# Patient Record
Sex: MALE | Race: BLACK OR AFRICAN AMERICAN
[De-identification: names, ages, dates, MRNs, and addresses within clinical notes are randomized per-mention and may not be internally consistent; named-entity substitution may affect disease eponyms.]

---

## 2019-08-12 ENCOUNTER — HOSPITAL ENCOUNTER (INPATIENT)
Dept: HOSPITAL 74 - YASAS | Age: 49
LOS: 5 days | Discharge: OTHER INSTITUTION | DRG: 773 | End: 2019-08-17
Attending: SURGERY | Admitting: SURGERY
Payer: COMMERCIAL

## 2019-08-12 VITALS — BODY MASS INDEX: 22.8 KG/M2

## 2019-08-12 DIAGNOSIS — I10: ICD-10-CM

## 2019-08-12 DIAGNOSIS — Z91.14: ICD-10-CM

## 2019-08-12 DIAGNOSIS — F11.23: Primary | ICD-10-CM

## 2019-08-12 DIAGNOSIS — F14.20: ICD-10-CM

## 2019-08-12 PROCEDURE — HZ2ZZZZ DETOXIFICATION SERVICES FOR SUBSTANCE ABUSE TREATMENT: ICD-10-PCS | Performed by: ALLERGY & IMMUNOLOGY

## 2019-08-12 RX ADMIN — Medication SCH MG: at 22:51

## 2019-08-12 RX ADMIN — NICOTINE SCH: 21 PATCH TRANSDERMAL at 22:52

## 2019-08-12 NOTE — HP
COWS





- Scale


Resting Pulse: 0= MS 80 or Below


Sweatin= Chills/Flushing


Restless Observation: 0= Sits Still


Pupil Size: 0= Normal to Room Light


Bone or Joint Aches: 1= Mild Discomfort


Runny Nose/ Eye Tearin= Runny Nose/Eyes


GI Upset > 30mins: 1= Stomach Cramp


Tremor Observation: 0= None


Yawning Observation: 1= 1-2x During Session


Anxiety or Irritability: 1=Feels Anxious/Irritable


Goose Flesh Skin: 3=Piloerection


COWS Score: 10





CIWA Score





- Admission Criteria


[facility] Guidelines: Admission for Medically Managed Detox: 


Requires at least one of the followin. CIWA greater than 12


2. Seizures within the past 24 hours


3. Delirium tremens within the past 24 hours


4. Hallucinations within the past 24 hours


5. Acute intervention needed for co  occurring medical disorder


6. Acute intervention needed for co  occurring psychiatric disorder


7. Severe withdrawal that cannot be handled at a lower level of care (continued


    vomiting, continued diarrhea, abnormal vital signs) requiring intravenous


    medication and/or fluids


8. Pregnancy








Admission [facility]


Chief Complaint: 


detox from heroin


Allergies/Adverse Reactions: 


 Allergies











Allergy/AdvReac Type Severity Reaction Status Date / Time


 


No Known Allergies Allergy   Verified 19 17:44











History of Present Illness: 


Mr. Aguirre is a 50yo male with heroin use disorder, cocaine use disorder, and 

HTN who presents for detox from heroin. He has been snorting usng 1 bundle/

daily x 6 months, following the death of his mother. He has been using heroin 

since his 20s. He also snorts $100 of cocaine daily. He reports drinking 2 oz 

of vodka daily. He has been to detox multiple times and is requesting methadone 

for detox. He currently endorses fatigue, fever, chills, productive cough. He 

denies n/v/d. He is currently homeless and sometimes stays in shelters in 

[place]. He reports taking his amlodipine daily.





PMH: heroin use disorder, cocaine use disorder, and HTN


surgical hx: jaw fx


family hx: father HTN


meds: amlodipine


NKDA





- Ebola screening


Have you traveled outside of the country in the last 21 days: No


Have you had contact with anyone from an Ebola affected area: No





- Review of Systems


Constitutional: Chills, Diaphoresis, Fever, Weakness


EENT: reports: Nose Congestion


Respiratory: reports: Productive cough (whitish sputum).  denies: Shortness of 

Breath, Wheezing


Cardiac: denies: Chest Pain


GI: denies: Diarrhea, Nausea, Vomiting


Musculoskeletal: reports: Joint Pain


Integumentary: reports: Rash


Neuro: denies: Headache, Dizziness


Endocrine: reports: No Symptoms Reported


Hematology: reports: No Symptoms Reported


Psychiatric: reports: Judgement Intact, Orientated x3, Agitated (mild)





Patient History





- Patient Medical History


Hx Asthma: No


Hx Hypertension: Yes


Hx Diabetes: No





- Patient Surgical History


Past Surgical History: Yes


Other Surgical History: jaw





- Smoking Cessation


Smoking history: Current every day smoker


Have you smoked in the past 12 months: Yes


Aproximately how many cigarettes per day: 20


Initiated information on smoking cessation: Yes


'Breaking Loose' booklet given: 19





- Substances abused


  ** Alcohol


Substance route: Oral


Frequency: Daily


Amount used: 1 pint of vodka


Age of first use: 15


Date of last use: 19





  ** Heroin


Substance route: Inhalation


Frequency: Daily


Amount used: 10 bags/day


Age of first use: 20


Date of last use: 19





  ** Cocaine


Substance route: Inhalation


Frequency: Daily


Amount used: $100


Age of first use: 25


Date of last use: 19





Family Disease History





- Family Disease History


Family Disease History: Heart Disease: Father (HTN)





Admission Physical Exam [facility]





- Vital Signs


Vital Signs: 


 Vital Signs - 24 hr











  19





  17:48


 


Temperature 100.3 F H


 


Pulse Rate 79


 


Respiratory 16





Rate 


 


Blood Pressure 162/110 H














- Physical


General Appearance: Yes: Other (somnolent)


HEENTM: Yes: Hearing grossly Normal, Normocephalic, ADRY, Other (dry mucous 

membranes)


Respiratory: Yes: Lungs Clear, No Respiratory Distress


Neck: Yes: Other (rash)


Cardiology: Yes: Regular Rhythm, Regular Rate


Abdominal: Yes: Normal Bowel Sounds, Non Tender


Back: Yes: Normal Inspection


Musculoskeletal: Yes: full range of Motion


Extremities: Yes: Normal Capillary Refill


Neurological: Yes: CNs II-XII NML intact, Fully Oriented, Alert, Motor Strength 

5/5


Integumentary: Yes: Erythema (non-prurutic, non-tender macular rash along left 

chest, above clavicle, onto upper back and lateral neck)





- Diagnostic


(1) Severe heroin use disorder


Current Visit: Yes   Status: Chronic   





(2) Cocaine use disorder


Current Visit: Yes   Status: Chronic   





(3) Hypertension


Current Visit: Yes   Status: Chronic   


Qualifiers: 


   Hypertension type: essential hypertension   Qualified Code(s): I10 - 

Essential (primary) hypertension   





Cleared for Admission [facility]





- Detox or Rehab


[facility] Level of Care: Medically Managed





Breathalyzer





- Breathalyzer


Breathalyzer: 0





Inpatient Rehab Admission





- Rehab Decision to Admit


Inpatient rehab admission?: No

## 2019-08-13 LAB
ALBUMIN SERPL-MCNC: 3.5 G/DL (ref 3.4–5)
ALP SERPL-CCNC: 55 U/L (ref 45–117)
ALT SERPL-CCNC: 13 U/L (ref 13–61)
ANION GAP SERPL CALC-SCNC: 5 MMOL/L (ref 8–16)
AST SERPL-CCNC: 10 U/L (ref 15–37)
BILIRUB SERPL-MCNC: 0.5 MG/DL (ref 0.2–1)
BUN SERPL-MCNC: 15.1 MG/DL (ref 7–18)
CALCIUM SERPL-MCNC: 9.1 MG/DL (ref 8.5–10.1)
CHLORIDE SERPL-SCNC: 102 MMOL/L (ref 98–107)
CO2 SERPL-SCNC: 32 MMOL/L (ref 21–32)
CREAT SERPL-MCNC: 1.1 MG/DL (ref 0.55–1.3)
DEPRECATED RDW RBC AUTO: 13.6 % (ref 11.9–15.9)
GLUCOSE SERPL-MCNC: 79 MG/DL (ref 74–106)
HCT VFR BLD CALC: 43 % (ref 35.4–49)
HGB BLD-MCNC: 14.4 GM/DL (ref 11.7–16.9)
MCH RBC QN AUTO: 30.9 PG (ref 25.7–33.7)
MCHC RBC AUTO-ENTMCNC: 33.4 G/DL (ref 32–35.9)
MCV RBC: 92.4 FL (ref 80–96)
PLATELET # BLD AUTO: 312 K/MM3 (ref 134–434)
PMV BLD: 8.1 FL (ref 7.5–11.1)
POTASSIUM SERPLBLD-SCNC: 4 MMOL/L (ref 3.5–5.1)
PROT SERPL-MCNC: 7 G/DL (ref 6.4–8.2)
RBC # BLD AUTO: 4.65 M/MM3 (ref 4–5.6)
SODIUM SERPL-SCNC: 140 MMOL/L (ref 136–145)
WBC # BLD AUTO: 8.7 K/MM3 (ref 4–10)

## 2019-08-13 RX ADMIN — HYDROXYZINE PAMOATE PRN MG: 25 CAPSULE ORAL at 22:12

## 2019-08-13 RX ADMIN — Medication SCH TAB: at 10:10

## 2019-08-13 RX ADMIN — ASPIRIN 81 MG SCH MG: 81 TABLET ORAL at 10:09

## 2019-08-13 RX ADMIN — AMLODIPINE BESYLATE SCH MG: 10 TABLET ORAL at 10:10

## 2019-08-13 RX ADMIN — Medication PRN MG: at 22:12

## 2019-08-13 RX ADMIN — NICOTINE SCH: 21 PATCH TRANSDERMAL at 10:10

## 2019-08-13 RX ADMIN — Medication SCH MG: at 22:12

## 2019-08-13 NOTE — EKG
Test Reason : 

Blood Pressure : ***/*** mmHG

Vent. Rate : 063 BPM     Atrial Rate : 063 BPM

   P-R Int : 146 ms          QRS Dur : 096 ms

    QT Int : 450 ms       P-R-T Axes : 038 -38 043 degrees

   QTc Int : 460 ms

 

NORMAL SINUS RHYTHM

LEFT AXIS DEVIATION

INCOMPLETE RIGHT BUNDLE BRANCH BLOCK

MINIMAL VOLTAGE CRITERIA FOR LVH, MAY BE NORMAL VARIANT

SEPTAL INFARCT , AGE UNDETERMINED

ABNORMAL ECG

NO PREVIOUS ECGS AVAILABLE

Confirmed by Vickey Espinal MD ([id number]) on 8/13/2019 11:28:41 AM

 

Referred By:             Confirmed By:Vickey Espinal MD

## 2019-08-14 LAB
APPEARANCE UR: CLEAR
BILIRUB UR STRIP.AUTO-MCNC: NEGATIVE MG/DL
COLOR UR: YELLOW
KETONES UR QL STRIP: NEGATIVE
LEUKOCYTE ESTERASE UR QL STRIP.AUTO: NEGATIVE
NITRITE UR QL STRIP: NEGATIVE
PH UR: 7.5 [PH] (ref 5–8)
PROT UR QL STRIP: (no result)
PROT UR QL STRIP: NEGATIVE
SP GR UR: 1.01 (ref 1.01–1.03)
UROBILINOGEN UR STRIP-MCNC: 0.2 MG/DL (ref 0.2–1)

## 2019-08-14 RX ADMIN — AMLODIPINE BESYLATE SCH MG: 10 TABLET ORAL at 10:12

## 2019-08-14 RX ADMIN — CLINDAMYCIN PHOSPHATE SCH APPLIC: 10 SOLUTION TOPICAL at 22:22

## 2019-08-14 RX ADMIN — Medication SCH TAB: at 10:12

## 2019-08-14 RX ADMIN — ASPIRIN 81 MG SCH MG: 81 TABLET ORAL at 10:12

## 2019-08-14 RX ADMIN — CLINDAMYCIN PHOSPHATE SCH APPLIC: 10 SOLUTION TOPICAL at 15:55

## 2019-08-14 RX ADMIN — PENICILLIN V POTASSIUM SCH MG: 500 TABLET, FILM COATED ORAL at 15:55

## 2019-08-14 RX ADMIN — PENICILLIN V POTASSIUM SCH MG: 500 TABLET, FILM COATED ORAL at 22:22

## 2019-08-14 RX ADMIN — Medication PRN MG: at 22:22

## 2019-08-14 RX ADMIN — HYDROXYZINE PAMOATE PRN MG: 25 CAPSULE ORAL at 10:12

## 2019-08-14 RX ADMIN — Medication SCH MG: at 22:22

## 2019-08-14 RX ADMIN — NICOTINE SCH: 21 PATCH TRANSDERMAL at 10:12

## 2019-08-14 RX ADMIN — IBUPROFEN PRN MG: 400 TABLET, FILM COATED ORAL at 22:24

## 2019-08-14 NOTE — EKG
Test Reason : 

Blood Pressure : ***/*** mmHG

Vent. Rate : 060 BPM     Atrial Rate : 060 BPM

   P-R Int : 148 ms          QRS Dur : 096 ms

    QT Int : 436 ms       P-R-T Axes : 049 -11 042 degrees

   QTc Int : 436 ms

 

NORMAL SINUS RHYTHM

NONSPECIFIC T WAVE ABNORMALITY

ABNORMAL ECG

WHEN COMPARED WITH ECG OF 12-AUG-2019 20:53,

INCOMPLETE RIGHT BUNDLE BRANCH BLOCK IS NO LONGER PRESENT

CRITERIA FOR SEPTAL INFARCT ARE NO LONGER PRESENT

Confirmed by CHANTEL LANDRUM, JUANA ([id number]) on 8/14/2019 11:48:30 AM

 

Referred By:             Confirmed By:JUANA GOLDEN MD

## 2019-08-15 RX ADMIN — CLINDAMYCIN PHOSPHATE SCH APPLIC: 10 SOLUTION TOPICAL at 22:21

## 2019-08-15 RX ADMIN — PENICILLIN V POTASSIUM SCH MG: 500 TABLET, FILM COATED ORAL at 10:05

## 2019-08-15 RX ADMIN — CLINDAMYCIN PHOSPHATE SCH APPLIC: 10 SOLUTION TOPICAL at 10:05

## 2019-08-15 RX ADMIN — Medication SCH MG: at 22:21

## 2019-08-15 RX ADMIN — NICOTINE SCH: 21 PATCH TRANSDERMAL at 10:05

## 2019-08-15 RX ADMIN — PENICILLIN V POTASSIUM SCH MG: 500 TABLET, FILM COATED ORAL at 22:21

## 2019-08-15 RX ADMIN — Medication SCH TAB: at 10:05

## 2019-08-15 RX ADMIN — IBUPROFEN PRN MG: 400 TABLET, FILM COATED ORAL at 22:22

## 2019-08-15 RX ADMIN — AMLODIPINE BESYLATE SCH MG: 10 TABLET ORAL at 10:05

## 2019-08-15 RX ADMIN — ASPIRIN 81 MG SCH MG: 81 TABLET ORAL at 10:05

## 2019-08-15 RX ADMIN — Medication PRN MG: at 22:21

## 2019-08-16 RX ADMIN — Medication SCH TAB: at 10:42

## 2019-08-16 RX ADMIN — PENICILLIN V POTASSIUM SCH MG: 500 TABLET, FILM COATED ORAL at 22:18

## 2019-08-16 RX ADMIN — ASPIRIN 81 MG SCH MG: 81 TABLET ORAL at 10:41

## 2019-08-16 RX ADMIN — AMLODIPINE BESYLATE SCH MG: 10 TABLET ORAL at 10:42

## 2019-08-16 RX ADMIN — NICOTINE SCH: 21 PATCH TRANSDERMAL at 10:42

## 2019-08-16 RX ADMIN — CLINDAMYCIN PHOSPHATE SCH APPLIC: 10 SOLUTION TOPICAL at 10:41

## 2019-08-16 RX ADMIN — CLINDAMYCIN PHOSPHATE SCH APPLIC: 10 SOLUTION TOPICAL at 22:18

## 2019-08-16 RX ADMIN — Medication PRN MG: at 22:19

## 2019-08-16 RX ADMIN — Medication SCH MG: at 22:18

## 2019-08-16 RX ADMIN — PENICILLIN V POTASSIUM SCH MG: 500 TABLET, FILM COATED ORAL at 10:42

## 2019-08-17 ENCOUNTER — HOSPITAL ENCOUNTER (INPATIENT)
Dept: HOSPITAL 74 - YASAS | Age: 49
LOS: 12 days | Discharge: HOME | DRG: 772 | End: 2019-08-29
Attending: NEUROMUSCULOSKELETAL MEDICINE & OMM | Admitting: NEUROMUSCULOSKELETAL MEDICINE & OMM
Payer: COMMERCIAL

## 2019-08-17 VITALS — DIASTOLIC BLOOD PRESSURE: 80 MMHG | SYSTOLIC BLOOD PRESSURE: 131 MMHG | HEART RATE: 64 BPM | TEMPERATURE: 97.6 F

## 2019-08-17 DIAGNOSIS — L02.411: ICD-10-CM

## 2019-08-17 DIAGNOSIS — L85.3: ICD-10-CM

## 2019-08-17 DIAGNOSIS — F14.20: ICD-10-CM

## 2019-08-17 DIAGNOSIS — F11.20: Primary | ICD-10-CM

## 2019-08-17 DIAGNOSIS — K08.89: ICD-10-CM

## 2019-08-17 DIAGNOSIS — L02.412: ICD-10-CM

## 2019-08-17 DIAGNOSIS — L73.9: ICD-10-CM

## 2019-08-17 DIAGNOSIS — I10: ICD-10-CM

## 2019-08-17 PROCEDURE — HZ42ZZZ GROUP COUNSELING FOR SUBSTANCE ABUSE TREATMENT, COGNITIVE-BEHAVIORAL: ICD-10-PCS | Performed by: ALLERGY & IMMUNOLOGY

## 2019-08-17 RX ADMIN — Medication SCH TAB: at 10:39

## 2019-08-17 RX ADMIN — CLINDAMYCIN PHOSPHATE SCH APPLIC: 10 SOLUTION TOPICAL at 10:39

## 2019-08-17 RX ADMIN — ASPIRIN 81 MG SCH MG: 81 TABLET ORAL at 10:39

## 2019-08-17 RX ADMIN — Medication PRN MG: at 23:09

## 2019-08-17 RX ADMIN — Medication SCH MG: at 23:15

## 2019-08-17 RX ADMIN — AMLODIPINE BESYLATE SCH MG: 10 TABLET ORAL at 10:39

## 2019-08-17 RX ADMIN — CLINDAMYCIN PHOSPHATE SCH APPLIC: 10 SOLUTION TOPICAL at 23:08

## 2019-08-17 RX ADMIN — NICOTINE SCH: 21 PATCH TRANSDERMAL at 10:39

## 2019-08-17 RX ADMIN — IBUPROFEN PRN MG: 400 TABLET, FILM COATED ORAL at 10:41

## 2019-08-17 NOTE — DS
[facility] Detox Discharge Summary


Admission Date: 


19





Discharge Date: 19





- History


Present History: Cocaine Dependence, Opioid Dependence


Additional Comments: 





PATIENT GOING TO [facility] ([place]) FOR AFTERCARE. PATIENT 

WAS DISCHARGED FROM DETOX UNIT TO BE TAKEN OVER TO REHAB UNIT IN STABLE MEDICAL 

CONDITION.


Pertinent Past History: 





HTN.





- Physical Exam Results


Vital Signs: 


 Vital Signs











Temperature  97.6 F   19 06:24


 


Pulse Rate  64   19 06:24


 


Respiratory Rate  18   19 06:24


 


Blood Pressure  131/80   19 06:24


 


O2 Sat by Pulse Oximetry (%)      











Pertinent Admission Physical Exam Findings: 





WITHDRAWAL SYMPTOMS.





 Laboratory Tests











  19





  08:05 08:05 08:05


 


WBC  8.7  


 


RBC  4.65  


 


Hgb  14.4  


 


Hct  43.0  


 


MCV  92.4  


 


MCH  30.9  


 


MCHC  33.4  


 


RDW  13.6  


 


Plt Count  312  


 


MPV  8.1  


 


Sodium   140 


 


Potassium   4.0 


 


Chloride   102 


 


Carbon Dioxide   32 


 


Anion Gap   5 L 


 


BUN   15.1 


 


Creatinine   1.1 


 


Est GFR (CKD-EPI)AfAm   90.88 


 


Est GFR (CKD-EPI)NonAf   78.41 


 


Random Glucose   79 


 


Calcium   9.1 


 


Total Bilirubin   0.5 


 


AST   10 L 


 


ALT   13 


 


Alkaline Phosphatase   55 


 


Total Protein   7.0 


 


Albumin   3.5 


 


Urine Color   


 


Urine Appearance   


 


Urine pH   


 


Ur Specific Gravity   


 


Urine Protein   


 


Urine Glucose (UA)   


 


Urine Ketones   


 


Urine Blood   


 


Urine Nitrite   


 


Urine Bilirubin   


 


Urine Urobilinogen   


 


Ur Leukocyte Esterase   


 


RPR Titer    Nonreactive


 


TB (QFT) Incubation   


 


TB Test (QFT) Nil   


 


TB Test (QFT) Mitogen   


 


TB Test (QFT) Antigen   


 


TB Test (QFT)   


 


TB Positive Criteria   














  19





  08:05 16:07


 


WBC  


 


RBC  


 


Hgb  


 


Hct  


 


MCV  


 


MCH  


 


MCHC  


 


RDW  


 


Plt Count  


 


MPV  


 


Sodium  


 


Potassium  


 


Chloride  


 


Carbon Dioxide  


 


Anion Gap  


 


BUN  


 


Creatinine  


 


Est GFR (CKD-EPI)AfAm  


 


Est GFR (CKD-EPI)NonAf  


 


Random Glucose  


 


Calcium  


 


Total Bilirubin  


 


AST  


 


ALT  


 


Alkaline Phosphatase  


 


Total Protein  


 


Albumin  


 


Urine Color   Yellow


 


Urine Appearance   Clear


 


Urine pH   7.5


 


Ur Specific Gravity   1.008 L


 


Urine Protein   Negative


 


Urine Glucose (UA)   Trace


 


Urine Ketones   Negative


 


Urine Blood   Negative


 


Urine Nitrite   Negative


 


Urine Bilirubin   Negative


 


Urine Urobilinogen   0.2


 


Ur Leukocyte Esterase   Negative


 


RPR Titer  


 


TB (QFT) Incubation   


 


TB Test (QFT) Nil  0.09 


 


TB Test (QFT) Mitogen  >10.00 


 


TB Test (QFT) Antigen  0.09 


 


TB Test (QFT)  Negative 


 


TB Positive Criteria   








LABS NOTED.





- Treatment


Hospital Course: Detox Protocol Followed, Detoxed Safely, Responded well, 

Discharged Condition Good, Rehab Referral Accepted


Patient has Accepted a [facility] Referral to: [facility] ([place], [place]).





- Medication


Discharge Medications: 


Ambulatory Orders





Amlodipine Besylate [Norvasc -] 10 mg PO DAILY 30 Days #30 tablet 19 


Aspirin 81 mg PO DAILY 30 Days #30 tab.chew 19 











- Diagnosis


(1) Cocaine use disorder


Status: Chronic   





(2) Hypertension


Status: Chronic   


Qualifiers: 


   Hypertension type: essential hypertension   Qualified Code(s): I10 - 

Essential (primary) hypertension   





(3) Severe heroin use disorder


Status: Acute   





- AMA


Did Patient Leave Against Medical Advice: No





[facility] COWS





- Scale


Resting Pulse: 0= DC 80 or Below


Sweatin= No chills or Flushing


Restless Observation: 1= Difficult to Sit Still


Pupil Size: 0= Normal to Room Light


Bone or Joint Aches: 0= None


Runny Nose/ Eye Tearin= None


GI Upset > 30mins: 0= None


Tremor Observation of Outstretched Hands: 0= None


Yawning Observation: 1= 1-2x During Session


Anxiety or Irritability: 0= None


Goose Flesh Skin: 0=Smooth Skin


COWS Score: 2

## 2019-08-17 NOTE — HP
[facility] MD Rehab Assess/Revision





- Admission History


Admitted to Rehab [facility]


Date of Admission to Rehab: 08/17/2019





- Vital signs


Vital Signs: 





NOTED; STABLE.





- Findings


Detox History & Physical reviewed: Yes


Concur with findings: Yes


Comments/Additional Findings: PATIENT'S MEDICAL / MEDICATION HISTORY REVIEWED 

PRIOR TO DISCHARGE FROM DETOX UNIT. PATIENT WAS DISCHARGED FROM DETOX UNIT TO 

BE TAKEN OVER TO REHAB UNIT IN STABLE MEDICAL CONDITION.





Inpatient Rehab Admission





- Rehab Decision to Admit


Inpatient rehab admission?: Yes





- Initial Determination


Are CD services needed?: Yes


Free of communicable disease: Yes


Not in need of hospitalization: Yes





- Rehab Admission Criteria


Previous failed treatment: Yes


Poor recovery environment: Yes


Comorbidities: Yes


Lacks judgement: No


Patient is meeting Inpatient Rehab admission criteria:: Yes

## 2019-08-18 RX ADMIN — Medication SCH TAB: at 10:36

## 2019-08-18 RX ADMIN — CLINDAMYCIN PHOSPHATE SCH APPLIC: 10 SOLUTION TOPICAL at 21:57

## 2019-08-18 RX ADMIN — Medication SCH MG: at 21:56

## 2019-08-18 RX ADMIN — ACETAMINOPHEN PRN MG: 325 TABLET ORAL at 10:38

## 2019-08-18 RX ADMIN — NICOTINE SCH: 21 PATCH TRANSDERMAL at 10:36

## 2019-08-18 RX ADMIN — CLINDAMYCIN PHOSPHATE SCH APPLIC: 10 SOLUTION TOPICAL at 10:36

## 2019-08-18 RX ADMIN — Medication PRN MG: at 21:56

## 2019-08-18 RX ADMIN — AMLODIPINE BESYLATE SCH MG: 10 TABLET ORAL at 10:35

## 2019-08-18 RX ADMIN — ASPIRIN 81 MG SCH MG: 81 TABLET ORAL at 10:34

## 2019-08-19 RX ADMIN — Medication SCH MG: at 21:31

## 2019-08-19 RX ADMIN — CLINDAMYCIN PHOSPHATE SCH APPLIC: 10 SOLUTION TOPICAL at 09:59

## 2019-08-19 RX ADMIN — ACETAMINOPHEN PRN MG: 325 TABLET ORAL at 10:06

## 2019-08-19 RX ADMIN — AMLODIPINE BESYLATE SCH MG: 10 TABLET ORAL at 09:59

## 2019-08-19 RX ADMIN — Medication SCH TAB: at 09:59

## 2019-08-19 RX ADMIN — METHOCARBAMOL SCH MG: 500 TABLET ORAL at 21:30

## 2019-08-19 RX ADMIN — CLINDAMYCIN PHOSPHATE SCH APPLIC: 10 SOLUTION TOPICAL at 21:31

## 2019-08-19 RX ADMIN — Medication PRN MG: at 21:31

## 2019-08-19 RX ADMIN — ASPIRIN 81 MG SCH MG: 81 TABLET ORAL at 09:59

## 2019-08-19 RX ADMIN — NICOTINE SCH: 21 PATCH TRANSDERMAL at 09:59

## 2019-08-20 RX ADMIN — METHOCARBAMOL SCH MG: 500 TABLET ORAL at 14:15

## 2019-08-20 RX ADMIN — CLINDAMYCIN PHOSPHATE SCH APPLIC: 10 SOLUTION TOPICAL at 21:22

## 2019-08-20 RX ADMIN — METHOCARBAMOL SCH MG: 500 TABLET ORAL at 06:33

## 2019-08-20 RX ADMIN — METHOCARBAMOL SCH MG: 500 TABLET ORAL at 21:22

## 2019-08-20 RX ADMIN — ASPIRIN 81 MG SCH MG: 81 TABLET ORAL at 10:25

## 2019-08-20 RX ADMIN — NICOTINE SCH: 21 PATCH TRANSDERMAL at 10:25

## 2019-08-20 RX ADMIN — Medication PRN MG: at 21:22

## 2019-08-20 RX ADMIN — Medication SCH MG: at 21:22

## 2019-08-20 RX ADMIN — Medication SCH TAB: at 10:25

## 2019-08-20 RX ADMIN — CLINDAMYCIN PHOSPHATE SCH APPLIC: 10 SOLUTION TOPICAL at 10:26

## 2019-08-20 RX ADMIN — AMLODIPINE BESYLATE SCH MG: 10 TABLET ORAL at 10:25

## 2019-08-21 RX ADMIN — Medication PRN MG: at 21:23

## 2019-08-21 RX ADMIN — ACETAMINOPHEN PRN MG: 325 TABLET ORAL at 11:12

## 2019-08-21 RX ADMIN — METHOCARBAMOL SCH MG: 500 TABLET ORAL at 06:24

## 2019-08-21 RX ADMIN — METHOCARBAMOL SCH MG: 500 TABLET ORAL at 21:22

## 2019-08-21 RX ADMIN — AMLODIPINE BESYLATE SCH MG: 10 TABLET ORAL at 10:07

## 2019-08-21 RX ADMIN — ASPIRIN 81 MG SCH MG: 81 TABLET ORAL at 10:07

## 2019-08-21 RX ADMIN — NICOTINE SCH: 21 PATCH TRANSDERMAL at 10:08

## 2019-08-21 RX ADMIN — METHOCARBAMOL SCH MG: 500 TABLET ORAL at 14:11

## 2019-08-21 RX ADMIN — CLINDAMYCIN PHOSPHATE SCH APPLIC: 10 SOLUTION TOPICAL at 21:23

## 2019-08-21 RX ADMIN — CLINDAMYCIN PHOSPHATE SCH APPLIC: 10 SOLUTION TOPICAL at 10:08

## 2019-08-21 RX ADMIN — Medication SCH TAB: at 10:07

## 2019-08-21 RX ADMIN — Medication SCH MG: at 21:22

## 2019-08-22 RX ADMIN — METHOCARBAMOL SCH MG: 500 TABLET ORAL at 06:51

## 2019-08-22 RX ADMIN — ACETAMINOPHEN PRN MG: 325 TABLET ORAL at 06:51

## 2019-08-22 RX ADMIN — Medication SCH TAB: at 10:16

## 2019-08-22 RX ADMIN — AMLODIPINE BESYLATE SCH MG: 10 TABLET ORAL at 10:16

## 2019-08-22 RX ADMIN — Medication PRN MG: at 21:19

## 2019-08-22 RX ADMIN — CLINDAMYCIN PHOSPHATE SCH APPLIC: 10 SOLUTION TOPICAL at 21:19

## 2019-08-22 RX ADMIN — NICOTINE SCH: 21 PATCH TRANSDERMAL at 10:15

## 2019-08-22 RX ADMIN — METHOCARBAMOL SCH MG: 500 TABLET ORAL at 21:19

## 2019-08-22 RX ADMIN — ASPIRIN 81 MG SCH MG: 81 TABLET ORAL at 10:16

## 2019-08-22 RX ADMIN — METHOCARBAMOL SCH MG: 500 TABLET ORAL at 14:50

## 2019-08-22 RX ADMIN — Medication SCH MG: at 21:19

## 2019-08-22 RX ADMIN — CLINDAMYCIN PHOSPHATE SCH APPLIC: 10 SOLUTION TOPICAL at 10:16

## 2019-08-22 NOTE — PN
[facility] Progress Note


Note: 








 Vital Signs











Temperature  97.5 F L  08/22/19 06:51


 


Pulse Rate  83   08/22/19 06:51


 


Respiratory Rate  18   08/22/19 06:51


 


Blood Pressure  131/96   08/22/19 06:51


 


O2 Sat by Pulse Oximetry (%)      











c/o of dental pain no distress 


ibuprofen prn 


anbesol prn 


continue to monitor 


follow up with dental post discharge

## 2019-08-23 RX ADMIN — IBUPROFEN PRN MG: 400 TABLET, FILM COATED ORAL at 06:20

## 2019-08-23 RX ADMIN — METHOCARBAMOL SCH MG: 500 TABLET ORAL at 06:21

## 2019-08-23 RX ADMIN — CLINDAMYCIN PHOSPHATE SCH: 10 SOLUTION TOPICAL at 23:08

## 2019-08-23 RX ADMIN — CLINDAMYCIN PHOSPHATE SCH APPLIC: 10 SOLUTION TOPICAL at 10:14

## 2019-08-23 RX ADMIN — METHOCARBAMOL SCH MG: 500 TABLET ORAL at 21:31

## 2019-08-23 RX ADMIN — Medication PRN MG: at 21:32

## 2019-08-23 RX ADMIN — Medication SCH MG: at 21:31

## 2019-08-23 RX ADMIN — METHOCARBAMOL SCH MG: 500 TABLET ORAL at 14:11

## 2019-08-23 RX ADMIN — Medication SCH TAB: at 10:15

## 2019-08-23 RX ADMIN — AMLODIPINE BESYLATE SCH MG: 10 TABLET ORAL at 10:15

## 2019-08-23 RX ADMIN — NICOTINE SCH: 21 PATCH TRANSDERMAL at 10:13

## 2019-08-23 RX ADMIN — ASPIRIN 81 MG SCH MG: 81 TABLET ORAL at 10:13

## 2019-08-23 NOTE — PN
[facility] Progress Note


Note: 





Pt states he has trouble staying asleep. Falls asleep at 11:30, wakes up at 1 

AM and then stays awake until 5AM.


Would like to try different medications.


Plan:


increase melatonin to 10mg qhs prn and 


trazodone 100mg qhs prn


Quantiferon test- neg


Wants HIV test- ordered

## 2019-08-24 RX ADMIN — ALUMINUM HYDROXIDE, MAGNESIUM HYDROXIDE, AND SIMETHICONE PRN ML: 200; 200; 20 SUSPENSION ORAL at 19:48

## 2019-08-24 RX ADMIN — METHOCARBAMOL SCH MG: 500 TABLET ORAL at 06:44

## 2019-08-24 RX ADMIN — METHOCARBAMOL SCH MG: 500 TABLET ORAL at 22:23

## 2019-08-24 RX ADMIN — ASPIRIN 81 MG SCH MG: 81 TABLET ORAL at 09:56

## 2019-08-24 RX ADMIN — CLINDAMYCIN PHOSPHATE SCH APPLIC: 10 SOLUTION TOPICAL at 09:57

## 2019-08-24 RX ADMIN — AMLODIPINE BESYLATE SCH MG: 10 TABLET ORAL at 09:56

## 2019-08-24 RX ADMIN — Medication SCH TAB: at 09:56

## 2019-08-24 RX ADMIN — NICOTINE SCH: 21 PATCH TRANSDERMAL at 09:57

## 2019-08-24 RX ADMIN — Medication PRN MG: at 22:23

## 2019-08-24 RX ADMIN — Medication SCH MG: at 22:24

## 2019-08-24 RX ADMIN — METHOCARBAMOL SCH MG: 500 TABLET ORAL at 13:39

## 2019-08-25 RX ADMIN — METHOCARBAMOL SCH MG: 500 TABLET ORAL at 06:52

## 2019-08-25 RX ADMIN — ASPIRIN 81 MG SCH MG: 81 TABLET ORAL at 09:48

## 2019-08-25 RX ADMIN — BENZOCAINE PRN APPLIC: 200 GEL TOPICAL at 03:07

## 2019-08-25 RX ADMIN — METHOCARBAMOL SCH MG: 500 TABLET ORAL at 21:19

## 2019-08-25 RX ADMIN — NICOTINE SCH: 21 PATCH TRANSDERMAL at 09:49

## 2019-08-25 RX ADMIN — AMLODIPINE BESYLATE SCH MG: 10 TABLET ORAL at 09:48

## 2019-08-25 RX ADMIN — Medication SCH MG: at 21:19

## 2019-08-25 RX ADMIN — Medication PRN MG: at 21:20

## 2019-08-25 RX ADMIN — Medication SCH TAB: at 09:48

## 2019-08-25 RX ADMIN — METHOCARBAMOL SCH MG: 500 TABLET ORAL at 13:59

## 2019-08-25 RX ADMIN — ACETAMINOPHEN PRN MG: 325 TABLET ORAL at 03:07

## 2019-08-26 RX ADMIN — METHOCARBAMOL SCH MG: 500 TABLET ORAL at 06:29

## 2019-08-26 RX ADMIN — METHOCARBAMOL SCH MG: 500 TABLET ORAL at 15:02

## 2019-08-26 RX ADMIN — METHOCARBAMOL SCH MG: 500 TABLET ORAL at 21:13

## 2019-08-26 RX ADMIN — Medication PRN MG: at 21:13

## 2019-08-26 RX ADMIN — Medication SCH TAB: at 09:50

## 2019-08-26 RX ADMIN — Medication SCH MG: at 21:13

## 2019-08-26 RX ADMIN — CLINDAMYCIN PHOSPHATE SCH APPLIC: 10 GEL TOPICAL at 21:16

## 2019-08-26 RX ADMIN — ASPIRIN 81 MG SCH MG: 81 TABLET ORAL at 09:50

## 2019-08-26 RX ADMIN — CLINDAMYCIN PHOSPHATE SCH APPLIC: 10 GEL TOPICAL at 12:54

## 2019-08-26 RX ADMIN — NICOTINE SCH: 21 PATCH TRANSDERMAL at 09:50

## 2019-08-26 RX ADMIN — AMLODIPINE BESYLATE SCH MG: 10 TABLET ORAL at 09:50

## 2019-08-26 NOTE — PN
[facility] Progress Note


Note: 


Patient seen for c/o "bump" to bilateral armpit areas. Patient was diagnosed 

with folliculitis/abscess. Patient treated with oral antibiotics and Cleocin 

gel x one week. Patient states symptoms have improved but armpits still have 

bumps. Patient denies fever, discharge and pain to area. 


 Laboratory Tests











  08/23/19





  12:00


 


HIV 1&2 Ag/Ab, 4th Gen  Non reactive








 Vital Signs (72 hours)











  08/24/19 08/24/19 08/24/19





  00:30 03:30 07:02


 


Temperature   


 


Pulse Rate   


 


Respiratory 18 18 18





Rate   


 


Blood Pressure   














  08/24/19 08/25/19 08/25/19





  10:37 00:30 07:21


 


Temperature 98.4 F  97.7 F


 


Pulse Rate 92 H  75


 


Respiratory 18 18 18





Rate   


 


Blood Pressure 120/82  145/77














  08/26/19 08/26/19 08/26/19





  00:30 03:30 07:09


 


Temperature   98.6 F


 


Pulse Rate   83


 


Respiratory 18 18 18





Rate   


 


Blood Pressure   126/93








PE:





alert and oriented x 3


skin warm and dry


bilateral armpits with hair, small pea sized papule palpated, no exudate, mild 

tenderness, no surrounding redness


ext full rom, amb ad katerin








A/P:





resolving folliculitis /abscess








Will extend cleocin topical gel 1% daily to AA x 4 more days


Monitor clinically

## 2019-08-27 RX ADMIN — METHOCARBAMOL SCH MG: 500 TABLET ORAL at 13:10

## 2019-08-27 RX ADMIN — TRAZODONE HYDROCHLORIDE PRN MG: 100 TABLET ORAL at 21:25

## 2019-08-27 RX ADMIN — METHOCARBAMOL SCH MG: 500 TABLET ORAL at 21:25

## 2019-08-27 RX ADMIN — METHOCARBAMOL SCH MG: 500 TABLET ORAL at 06:47

## 2019-08-27 RX ADMIN — Medication SCH MG: at 21:25

## 2019-08-27 RX ADMIN — Medication SCH TAB: at 10:04

## 2019-08-27 RX ADMIN — CLINDAMYCIN PHOSPHATE SCH APPLIC: 10 GEL TOPICAL at 10:04

## 2019-08-27 RX ADMIN — CLINDAMYCIN PHOSPHATE SCH APPLIC: 10 GEL TOPICAL at 21:27

## 2019-08-27 RX ADMIN — ALUMINUM HYDROXIDE, MAGNESIUM HYDROXIDE, AND SIMETHICONE PRN ML: 200; 200; 20 SUSPENSION ORAL at 22:00

## 2019-08-27 RX ADMIN — IBUPROFEN PRN MG: 400 TABLET, FILM COATED ORAL at 06:47

## 2019-08-27 RX ADMIN — ASPIRIN 81 MG SCH MG: 81 TABLET ORAL at 10:04

## 2019-08-27 RX ADMIN — NICOTINE SCH: 21 PATCH TRANSDERMAL at 10:05

## 2019-08-27 RX ADMIN — AMLODIPINE BESYLATE SCH MG: 10 TABLET ORAL at 10:04

## 2019-08-27 RX ADMIN — ALUMINUM HYDROXIDE, MAGNESIUM HYDROXIDE, AND SIMETHICONE PRN ML: 200; 200; 20 SUSPENSION ORAL at 01:17

## 2019-08-28 RX ADMIN — AMLODIPINE BESYLATE SCH MG: 10 TABLET ORAL at 09:56

## 2019-08-28 RX ADMIN — METHOCARBAMOL SCH MG: 500 TABLET ORAL at 21:23

## 2019-08-28 RX ADMIN — Medication SCH TAB: at 09:56

## 2019-08-28 RX ADMIN — TRAZODONE HYDROCHLORIDE PRN MG: 100 TABLET ORAL at 21:25

## 2019-08-28 RX ADMIN — Medication SCH MG: at 21:23

## 2019-08-28 RX ADMIN — CLINDAMYCIN PHOSPHATE SCH APPLIC: 10 GEL TOPICAL at 09:57

## 2019-08-28 RX ADMIN — METHOCARBAMOL SCH MG: 500 TABLET ORAL at 14:29

## 2019-08-28 RX ADMIN — ASPIRIN 81 MG SCH MG: 81 TABLET ORAL at 09:56

## 2019-08-28 RX ADMIN — BACITRACIN ZINC SCH: 500 OINTMENT TOPICAL at 21:50

## 2019-08-28 RX ADMIN — NICOTINE SCH: 21 PATCH TRANSDERMAL at 09:56

## 2019-08-28 RX ADMIN — METHOCARBAMOL SCH MG: 500 TABLET ORAL at 06:13

## 2019-08-28 RX ADMIN — CLINDAMYCIN PHOSPHATE SCH: 10 GEL TOPICAL at 21:25

## 2019-08-28 RX ADMIN — BACITRACIN ZINC SCH: 500 OINTMENT TOPICAL at 11:10

## 2019-08-28 NOTE — DS
[facility] Discharge Summary





- [facility] Discharge Summary


Admission Date: 08/17/19


Discharge Date: 08/28/19





- History


Present History: Alcohol dependence, Cocaine dependence, Opioid dependence


Pertinent Past History: 





48 y/o male who was admitted to rehab after completing [facility]. Pt has 

a hx of heroin use since 19 y/o, crack/ cocaine since 26 y/o and reports use of 

alcohol since 15 y/o-recently 1 pt vodka daily. SHx of fx jaw. PMHx of HTN-on 

amlodipine 10 mg po daily and Aspirin 81 mg po daily. No PPsyHx indicated. Pt 

is scheduled for discharge tomorrow and has been referred  for CD aftercare to 

[facility]. Pt reports  homelessness and scheduled to 

be picked up by aftercare referral in the morning. 





- Discharge Physical Exam


Vital Signs: 


 Vital Signs











Temperature  96.9 F L  08/28/19 06:42


 


Pulse Rate  84   08/28/19 06:42


 


Respiratory Rate  18   08/28/19 06:42


 


Blood Pressure  136/87   08/28/19 06:42


 


O2 Sat by Pulse Oximetry (%)      











Pertinent Admission Physical Exam Findings: 





 Laboratory Tests











  08/23/19





  12:00


 


HIV 1&2 Ag/Ab, 4th Gen  Non reactive








General:In no acute distress; Alert o x 3. Denies s/h/i


Heent:Normocephalic,Halina,hearing grossly normal


Cardiac:s1 s2, rrr


Lungs:cta, marquez., no sob


Abdomen:soft,=bs,nt,nd


Extremities/Integumentary:from,dry skin on feet;macular red scaly facial rash 

around nose, neck/chest/upper back.





- Treatment


Discharge Condition: Discharge condition good


Hospital Course: 





rehabilitate well, responded to treatment well, CD aftercare referral accepted.





- Medication


Discharge Medications: 


Ambulatory Orders





Amlodipine Besylate [Norvasc -] 10 mg PO DAILY 30 Days #30 tablet 08/17/19 


Aspirin 81 mg PO DAILY 30 Days #30 tab.chew 08/17/19 


Bacitracin - [Bacitracin Topical Ointment -] 1 applic TP DAILY #1 tube 08/28/19 


Multivitamin [Multiple Vitamins] 1 each PO DAILY #30 tablet 08/28/19 











- Medication-Assisted Treatment (MAT)


Medication-Assisted Treatment (MAT): No





- Discharge Instructions


Diet, activity, other medical instructions: 





Diet:Low  salt





Activity: As tolerated





Other medical instructions:Follow up with primary care at [facility]

/[facility] within 1-2 weeks after discharge.








- Diagnosis


(1) Dry skin dermatitis


Current Visit: Yes   Status: Chronic   





(2) Cocaine use disorder


Current Visit: Yes   Status: Chronic   





(3) Hypertension


Current Visit: Yes   Status: Chronic   


Qualifiers: 


   Hypertension type: essential hypertension   Qualified Code(s): I10 - 

Essential (primary) hypertension   





(4) Opioid dependence


Current Visit: Yes   Status: Chronic   


Qualifiers: 


   Substance use status: uncomplicated   Qualified Code(s): F11.20 - Opioid 

dependence, uncomplicated   





- Follow-up Referral


Minutes to complete discharge: 30





- AMA


Did Patient Leave Against Medical Advice: No

## 2019-08-29 VITALS — SYSTOLIC BLOOD PRESSURE: 131 MMHG | HEART RATE: 92 BPM | TEMPERATURE: 98.2 F | DIASTOLIC BLOOD PRESSURE: 90 MMHG

## 2019-08-29 RX ADMIN — Medication SCH TAB: at 09:28

## 2019-08-29 RX ADMIN — BACITRACIN ZINC SCH: 500 OINTMENT TOPICAL at 09:28

## 2019-08-29 RX ADMIN — CLINDAMYCIN PHOSPHATE SCH: 10 GEL TOPICAL at 09:29

## 2019-08-29 RX ADMIN — METHOCARBAMOL SCH MG: 500 TABLET ORAL at 06:32

## 2019-08-29 RX ADMIN — ASPIRIN 81 MG SCH MG: 81 TABLET ORAL at 09:28

## 2019-08-29 RX ADMIN — ACETAMINOPHEN PRN MG: 325 TABLET ORAL at 08:06

## 2019-08-29 RX ADMIN — NICOTINE SCH: 21 PATCH TRANSDERMAL at 09:28

## 2019-08-29 RX ADMIN — BENZOCAINE PRN APPLIC: 200 GEL TOPICAL at 06:33

## 2019-08-29 RX ADMIN — AMLODIPINE BESYLATE SCH MG: 10 TABLET ORAL at 09:28

## 2021-09-28 ENCOUNTER — HOSPITAL ENCOUNTER (INPATIENT)
Dept: HOSPITAL 74 - YASAS | Age: 51
LOS: 6 days | Discharge: HOME | DRG: 773 | End: 2021-10-04
Attending: ALLERGY & IMMUNOLOGY | Admitting: ALLERGY & IMMUNOLOGY
Payer: COMMERCIAL

## 2021-09-28 VITALS — BODY MASS INDEX: 33.1 KG/M2

## 2021-09-28 DIAGNOSIS — E66.9: ICD-10-CM

## 2021-09-28 DIAGNOSIS — M17.0: ICD-10-CM

## 2021-09-28 DIAGNOSIS — L85.3: ICD-10-CM

## 2021-09-28 DIAGNOSIS — F14.20: ICD-10-CM

## 2021-09-28 DIAGNOSIS — M54.50: ICD-10-CM

## 2021-09-28 DIAGNOSIS — F17.210: ICD-10-CM

## 2021-09-28 DIAGNOSIS — I10: ICD-10-CM

## 2021-09-28 DIAGNOSIS — G89.29: ICD-10-CM

## 2021-09-28 DIAGNOSIS — F10.230: ICD-10-CM

## 2021-09-28 DIAGNOSIS — F11.23: Primary | ICD-10-CM

## 2021-09-28 DIAGNOSIS — G47.30: ICD-10-CM

## 2021-09-28 PROCEDURE — U0005 INFEC AGEN DETEC AMPLI PROBE: HCPCS

## 2021-09-28 PROCEDURE — U0003 INFECTIOUS AGENT DETECTION BY NUCLEIC ACID (DNA OR RNA); SEVERE ACUTE RESPIRATORY SYNDROME CORONAVIRUS 2 (SARS-COV-2) (CORONAVIRUS DISEASE [COVID-19]), AMPLIFIED PROBE TECHNIQUE, MAKING USE OF HIGH THROUGHPUT TECHNOLOGIES AS DESCRIBED BY CMS-2020-01-R: HCPCS

## 2021-09-28 PROCEDURE — HZ2ZZZZ DETOXIFICATION SERVICES FOR SUBSTANCE ABUSE TREATMENT: ICD-10-PCS | Performed by: ALLERGY & IMMUNOLOGY

## 2021-09-28 PROCEDURE — C9803 HOPD COVID-19 SPEC COLLECT: HCPCS

## 2021-09-29 LAB
ALBUMIN SERPL-MCNC: 3.3 G/DL (ref 3.4–5)
ALP SERPL-CCNC: 60 U/L (ref 45–117)
ALT SERPL-CCNC: 16 U/L (ref 13–61)
ANION GAP SERPL CALC-SCNC: 4 MMOL/L (ref 8–16)
AST SERPL-CCNC: 5 U/L (ref 15–37)
BILIRUB SERPL-MCNC: 1 MG/DL (ref 0.2–1)
BUN SERPL-MCNC: 14.7 MG/DL (ref 7–18)
CALCIUM SERPL-MCNC: 8.8 MG/DL (ref 8.5–10.1)
CHLORIDE SERPL-SCNC: 102 MMOL/L (ref 98–107)
CO2 SERPL-SCNC: 33 MMOL/L (ref 21–32)
CREAT SERPL-MCNC: 0.9 MG/DL (ref 0.55–1.3)
DEPRECATED RDW RBC AUTO: 14.5 % (ref 11.9–15.9)
GLUCOSE SERPL-MCNC: 106 MG/DL (ref 74–106)
HCT VFR BLD CALC: 41.5 % (ref 35.4–49)
HGB BLD-MCNC: 14.1 GM/DL (ref 11.7–16.9)
HIV 1+2 AB+HIV1 P24 AG SERPL QL IA: NEGATIVE
MCH RBC QN AUTO: 31.1 PG (ref 25.7–33.7)
MCHC RBC AUTO-ENTMCNC: 34 G/DL (ref 32–35.9)
MCV RBC: 91.6 FL (ref 80–96)
PLATELET # BLD AUTO: 227 10^3/UL (ref 134–434)
PMV BLD: 8.5 FL (ref 7.5–11.1)
PROT SERPL-MCNC: 6.5 G/DL (ref 6.4–8.2)
RBC # BLD AUTO: 4.53 M/MM3 (ref 4–5.6)
SODIUM SERPL-SCNC: 140 MMOL/L (ref 136–145)
WBC # BLD AUTO: 6.2 K/MM3 (ref 4–10)

## 2021-09-29 RX ADMIN — Medication SCH MG: at 22:31

## 2021-09-29 RX ADMIN — ACETAMINOPHEN PRN MG: 325 TABLET ORAL at 04:03

## 2021-09-29 RX ADMIN — Medication SCH TAB: at 11:05

## 2021-09-29 RX ADMIN — METHOCARBAMOL PRN MG: 500 TABLET ORAL at 04:03

## 2021-09-29 RX ADMIN — METHOCARBAMOL PRN MG: 500 TABLET ORAL at 22:31

## 2021-09-29 RX ADMIN — NICOTINE SCH: 21 PATCH TRANSDERMAL at 11:04

## 2021-09-30 RX ADMIN — Medication SCH TAB: at 09:47

## 2021-09-30 RX ADMIN — LIDOCAINE SCH PATCH: 50 PATCH TOPICAL at 14:15

## 2021-09-30 RX ADMIN — Medication SCH MG: at 22:16

## 2021-09-30 RX ADMIN — NICOTINE SCH MG: 21 PATCH TRANSDERMAL at 09:47

## 2021-09-30 RX ADMIN — AMLODIPINE BESYLATE SCH MG: 10 TABLET ORAL at 09:47

## 2021-09-30 RX ADMIN — ACETAMINOPHEN PRN MG: 325 TABLET ORAL at 06:44

## 2021-09-30 RX ADMIN — METHOCARBAMOL PRN MG: 500 TABLET ORAL at 22:16

## 2021-09-30 RX ADMIN — METHOCARBAMOL PRN MG: 500 TABLET ORAL at 06:44

## 2021-10-01 RX ADMIN — NICOTINE SCH: 21 PATCH TRANSDERMAL at 10:14

## 2021-10-01 RX ADMIN — METHOCARBAMOL PRN MG: 500 TABLET ORAL at 22:59

## 2021-10-01 RX ADMIN — Medication SCH MG: at 22:55

## 2021-10-01 RX ADMIN — NICOTINE PRN MG: 4 INHALANT RESPIRATORY (INHALATION) at 16:25

## 2021-10-01 RX ADMIN — LIDOCAINE SCH PATCH: 50 PATCH TOPICAL at 12:35

## 2021-10-01 RX ADMIN — AMLODIPINE BESYLATE SCH MG: 10 TABLET ORAL at 10:14

## 2021-10-01 RX ADMIN — Medication SCH TAB: at 10:14

## 2021-10-01 RX ADMIN — LIDOCAINE SCH: 50 PATCH TOPICAL at 12:24

## 2021-10-01 RX ADMIN — Medication SCH MG: at 22:56

## 2021-10-02 RX ADMIN — Medication SCH MG: at 22:20

## 2021-10-02 RX ADMIN — Medication SCH: at 00:08

## 2021-10-02 RX ADMIN — ANALGESIC BALM SCH APPLIC: 1.74; 4.06 OINTMENT TOPICAL at 00:07

## 2021-10-02 RX ADMIN — LIDOCAINE SCH PATCH: 50 PATCH TOPICAL at 13:43

## 2021-10-02 RX ADMIN — ANALGESIC BALM SCH: 1.74; 4.06 OINTMENT TOPICAL at 22:20

## 2021-10-02 RX ADMIN — Medication SCH TAB: at 10:28

## 2021-10-02 RX ADMIN — AMLODIPINE BESYLATE SCH MG: 10 TABLET ORAL at 10:27

## 2021-10-02 RX ADMIN — ANALGESIC BALM SCH: 1.74; 4.06 OINTMENT TOPICAL at 13:44

## 2021-10-02 RX ADMIN — METHOCARBAMOL PRN MG: 500 TABLET ORAL at 05:44

## 2021-10-02 RX ADMIN — Medication SCH: at 22:20

## 2021-10-02 RX ADMIN — METHOCARBAMOL PRN MG: 500 TABLET ORAL at 17:39

## 2021-10-02 RX ADMIN — NICOTINE SCH: 21 PATCH TRANSDERMAL at 10:28

## 2021-10-03 RX ADMIN — AMLODIPINE BESYLATE SCH MG: 10 TABLET ORAL at 10:27

## 2021-10-03 RX ADMIN — METHOCARBAMOL PRN MG: 500 TABLET ORAL at 11:05

## 2021-10-03 RX ADMIN — ANALGESIC BALM SCH APPLIC: 1.74; 4.06 OINTMENT TOPICAL at 10:27

## 2021-10-03 RX ADMIN — Medication SCH TAB: at 10:28

## 2021-10-03 RX ADMIN — ANALGESIC BALM SCH: 1.74; 4.06 OINTMENT TOPICAL at 22:13

## 2021-10-03 RX ADMIN — Medication SCH: at 22:13

## 2021-10-03 RX ADMIN — NICOTINE PRN MG: 4 INHALANT RESPIRATORY (INHALATION) at 10:29

## 2021-10-03 RX ADMIN — Medication SCH MG: at 22:12

## 2021-10-03 RX ADMIN — ACETAMINOPHEN PRN MG: 325 TABLET ORAL at 11:05

## 2021-10-03 RX ADMIN — LIDOCAINE SCH PATCH: 50 PATCH TOPICAL at 10:27

## 2021-10-03 RX ADMIN — NICOTINE SCH: 21 PATCH TRANSDERMAL at 10:28

## 2021-10-04 VITALS — HEART RATE: 87 BPM | SYSTOLIC BLOOD PRESSURE: 145 MMHG | TEMPERATURE: 97.1 F | DIASTOLIC BLOOD PRESSURE: 87 MMHG

## 2021-10-04 RX ADMIN — Medication SCH TAB: at 09:15

## 2021-10-04 RX ADMIN — LIDOCAINE SCH: 50 PATCH TOPICAL at 09:15

## 2021-10-04 RX ADMIN — AMLODIPINE BESYLATE SCH MG: 10 TABLET ORAL at 09:14

## 2021-10-04 RX ADMIN — NICOTINE SCH: 21 PATCH TRANSDERMAL at 09:15

## 2021-10-04 RX ADMIN — ANALGESIC BALM SCH: 1.74; 4.06 OINTMENT TOPICAL at 09:15
